# Patient Record
Sex: FEMALE | Race: OTHER | Employment: UNEMPLOYED | URBAN - METROPOLITAN AREA
[De-identification: names, ages, dates, MRNs, and addresses within clinical notes are randomized per-mention and may not be internally consistent; named-entity substitution may affect disease eponyms.]

---

## 2024-05-02 ENCOUNTER — HOSPITAL ENCOUNTER (EMERGENCY)
Facility: HOSPITAL | Age: 34
Discharge: HOME | End: 2024-05-02
Attending: EMERGENCY MEDICINE

## 2024-05-02 VITALS
RESPIRATION RATE: 20 BRPM | TEMPERATURE: 98.2 F | OXYGEN SATURATION: 100 % | DIASTOLIC BLOOD PRESSURE: 55 MMHG | HEART RATE: 79 BPM | SYSTOLIC BLOOD PRESSURE: 114 MMHG

## 2024-05-02 DIAGNOSIS — M54.50 ACUTE LOW BACK PAIN: Primary | ICD-10-CM

## 2024-05-02 PROCEDURE — 99284 EMERGENCY DEPT VISIT MOD MDM: CPT

## 2024-05-02 PROCEDURE — 99282 EMERGENCY DEPT VISIT SF MDM: CPT

## 2024-05-02 RX ORDER — ACETAMINOPHEN 325 MG/1
975 TABLET ORAL ONCE
Status: COMPLETED | OUTPATIENT
Start: 2024-05-02 | End: 2024-05-02

## 2024-05-02 RX ORDER — ACETAMINOPHEN 500 MG
1000 TABLET ORAL EVERY 8 HOURS
Qty: 40 TABLET | Refills: 0 | Status: SHIPPED | OUTPATIENT
Start: 2024-05-02 | End: 2024-05-02

## 2024-05-02 RX ORDER — ACETAMINOPHEN 500 MG
1000 TABLET ORAL EVERY 8 HOURS
Qty: 40 TABLET | Refills: 0 | Status: SHIPPED | OUTPATIENT
Start: 2024-05-02

## 2024-05-02 RX ORDER — IBUPROFEN 600 MG/1
600 TABLET ORAL EVERY 6 HOURS PRN
Qty: 30 TABLET | Refills: 0 | Status: SHIPPED | OUTPATIENT
Start: 2024-05-02

## 2024-05-02 RX ORDER — IBUPROFEN 600 MG/1
600 TABLET ORAL ONCE
Status: COMPLETED | OUTPATIENT
Start: 2024-05-02 | End: 2024-05-02

## 2024-05-02 RX ORDER — IBUPROFEN 600 MG/1
600 TABLET ORAL EVERY 6 HOURS PRN
Qty: 30 TABLET | Refills: 0 | Status: SHIPPED | OUTPATIENT
Start: 2024-05-02 | End: 2024-05-02

## 2024-05-02 RX ORDER — METHOCARBAMOL 500 MG/1
500 TABLET, FILM COATED ORAL ONCE
Status: COMPLETED | OUTPATIENT
Start: 2024-05-02 | End: 2024-05-02

## 2024-05-02 RX ORDER — METHOCARBAMOL 500 MG/1
500 TABLET, FILM COATED ORAL 2 TIMES DAILY
Qty: 20 TABLET | Refills: 0 | Status: SHIPPED | OUTPATIENT
Start: 2024-05-02

## 2024-05-02 RX ORDER — OXYCODONE HYDROCHLORIDE 5 MG/1
5 TABLET ORAL ONCE
Status: COMPLETED | OUTPATIENT
Start: 2024-05-02 | End: 2024-05-02

## 2024-05-02 RX ORDER — METHOCARBAMOL 500 MG/1
500 TABLET, FILM COATED ORAL 2 TIMES DAILY
Qty: 20 TABLET | Refills: 0 | Status: SHIPPED | OUTPATIENT
Start: 2024-05-02 | End: 2024-05-02

## 2024-05-02 RX ORDER — DIAZEPAM 2 MG/1
2 TABLET ORAL ONCE
Status: COMPLETED | OUTPATIENT
Start: 2024-05-02 | End: 2024-05-02

## 2024-05-02 RX ADMIN — DIAZEPAM 2 MG: 2 TABLET ORAL at 01:39

## 2024-05-02 RX ADMIN — OXYCODONE 5 MG: 5 TABLET ORAL at 01:40

## 2024-05-02 RX ADMIN — IBUPROFEN 600 MG: 600 TABLET, FILM COATED ORAL at 01:39

## 2024-05-02 RX ADMIN — METHOCARBAMOL 500 MG: 500 TABLET ORAL at 01:39

## 2024-05-02 RX ADMIN — ACETAMINOPHEN 975 MG: 325 TABLET, FILM COATED ORAL at 01:39

## 2024-05-02 NOTE — ED PROVIDER NOTES
History  Chief Complaint   Patient presents with    Back Pain     Pt with back pain radiating down leg.  Pt has not taken any meds.      33-year-old female with no pertinent medical history presents to ED for evaluation of left low back pain.  Patient is [place]-speaking and a  was used during examination.  Patient states that since Sunday she has had excruciating left low back pain which radiates into her left leg.  Patient states that she was at work lifting up heavy supplies and felt a sudden onset stabbing pain in her left low back.  Since then it has increased and spread into her legs.  Denies acute loss of bowel or bladder continence.  Denies saddle anesthesia.  Denies history of low back pain.  Denies any recent falls.  Denies fever, chills, dysuria, hematuria, increased urinary frequency, vaginal discharge, shortness of breath, chest pain.  Patient has not taken any medication for her low back pain.         None       History reviewed. No pertinent past medical history.    History reviewed. No pertinent surgical history.    History reviewed. No pertinent family history.  I have reviewed and agree with the history as documented.    E-Cigarette/Vaping     E-Cigarette/Vaping Substances          Review of Systems   Musculoskeletal:  Positive for back pain.   All other systems reviewed and are negative.      Physical Exam  Physical Exam  Vitals and nursing note reviewed.   Constitutional:       General: She is not in acute distress.     Appearance: She is well-developed. She is not toxic-appearing or diaphoretic.      Comments: Appears uncomfortable in bed secondary to low back pain.   HENT:      Head: Normocephalic and atraumatic.   Eyes:      Conjunctiva/sclera: Conjunctivae normal.      Pupils: Pupils are equal, round, and reactive to light.   Cardiovascular:      Rate and Rhythm: Normal rate and regular rhythm.      Pulses: Normal pulses.      Heart sounds: Normal heart sounds. No murmur heard.      No friction rub. No gallop.   Pulmonary:      Effort: Pulmonary effort is normal. No respiratory distress.      Breath sounds: Normal breath sounds. No stridor. No wheezing, rhonchi or rales.   Abdominal:      Palpations: Abdomen is soft.      Tenderness: There is no abdominal tenderness.   Musculoskeletal:         General: No swelling.      Cervical back: Normal and neck supple.      Thoracic back: Normal.      Lumbar back: Tenderness present. No edema, deformity, signs of trauma, lacerations or bony tenderness.        Back:       Comments: Exquisitely tender to palpation of the left lumbar paraspinal musculature.  No midline tenderness.  No overlying skin changes of the low back.   Skin:     General: Skin is warm and dry.      Capillary Refill: Capillary refill takes less than 2 seconds.      Coloration: Skin is not jaundiced or pale.      Findings: No rash.   Neurological:      General: No focal deficit present.      Mental Status: She is alert and oriented to person, place, and time. Mental status is at baseline.   Psychiatric:         Mood and Affect: Mood normal.         Vital Signs  ED Triage Vitals [05/02/24 0059]   Temperature Pulse Respirations Blood Pressure SpO2   98.2 °F (36.8 °C) 79 20 114/55 100 %      Temp Source Heart Rate Source Patient Position - Orthostatic VS BP Location FiO2 (%)   Oral Monitor Sitting Right arm --      Pain Score       10 - Worst Possible Pain           Vitals:    05/02/24 0059   BP: 114/55   Pulse: 79   Patient Position - Orthostatic VS: Sitting         Visual Acuity      ED Medications  Medications   ibuprofen (MOTRIN) tablet 600 mg (600 mg Oral Given 5/2/24 0139)   acetaminophen (TYLENOL) tablet 975 mg (975 mg Oral Given 5/2/24 0139)   methocarbamol (ROBAXIN) tablet 500 mg (500 mg Oral Given 5/2/24 0139)   oxyCODONE (ROXICODONE) IR tablet 5 mg (5 mg Oral Given 5/2/24 0140)   diazepam (VALIUM) tablet 2 mg (2 mg Oral Given 5/2/24 0139)       Diagnostic Studies  Results  Reviewed       None                   No orders to display              Procedures  Procedures         ED Course                                             Medical Decision Making  33-year-old female presents to ED for evaluation of left low back pain as above.  On physical examination patient appears uncomfortable.  Vital signs stable.  No fever.  Nontachycardic.  Alert responding to questions appropriately.  Nontoxic-appearing.  Exquisitely tender to palpation of the left lumbar paraspinal musculature.  No murmur.  Normal breath sounds.  No loss of motor function of the lower extremities.  No saddle anesthesia.  No acute loss of bowel or bladder continence.  No urinary symptoms to suggest urinary tract etiology of pain.  Plan to treat patient's low back pain symptomatically with dose of Robaxin, Tylenol, ibuprofen, diazepam, oxycodone.  Will reevaluate after medication.    Reevaluated patient.  Patient feels better at this time.  Plan to discharge patient with prescription for Tylenol, ibuprofen, Robaxin.  Referral to [facility] comprehensive spine program provided.  Advised patient to modify activity in the coming days to reduce strain on low back.  Patient agreeable to plan.  Patient discharged.    Prior to discharge, discharge instructions were discussed with patient at bedside. Patient was provided both verbal and written instructions. Patient is understanding of the discharge instructions and is agreeable to plan of care. Return precautions were discussed with patient bedside, patient verbalized understanding of signs and symptoms that would necessitate return to the ED. All questions were answered. Patient was comfortable with the plan of care and discharged to home.     Risk  OTC drugs.  Prescription drug management.             Disposition  Final diagnoses:   Acute low back pain     Time reflects when diagnosis was documented in both MDM as applicable and the Disposition within this note       Time User  Action Codes Description Comment    5/2/2024  2:24 AM Edward Temple Add [M54.50] Acute low back pain           ED Disposition       ED Disposition   Discharge    Condition   Stable    Date/Time   Thu May 2, 2024  2:24 AM    Comment   Agustina Guevara discharge to home/self care.                   Follow-up Information       Follow up With Specialties Details Why Contact Info Additional Information    [facility]   [phone]            Discharge Medication List as of 5/2/2024  2:27 AM        START taking these medications    Details   acetaminophen (TYLENOL) 500 mg tablet Take 2 tablets (1,000 mg total) by mouth every 8 (eight) hours, Starting Thu 5/2/2024, Print      ibuprofen (MOTRIN) 600 mg tablet Take 1 tablet (600 mg total) by mouth every 6 (six) hours as needed for mild pain or moderate pain, Starting Thu 5/2/2024, Print      methocarbamol (ROBAXIN) 500 mg tablet Take 1 tablet (500 mg total) by mouth 2 (two) times a day, Starting u 5/2/2024, Print             No discharge procedures on file.    PDMP Review       None            ED Provider  Electronically Signed by             Edward Temple PA-C  05/03/24 1514

## 2024-09-21 ENCOUNTER — HOSPITAL ENCOUNTER (EMERGENCY)
Facility: HOSPITAL | Age: 34
Discharge: HOME | End: 2024-09-21

## 2024-09-21 VITALS
RESPIRATION RATE: 18 BRPM | OXYGEN SATURATION: 100 % | TEMPERATURE: 98 F | HEART RATE: 64 BPM | DIASTOLIC BLOOD PRESSURE: 74 MMHG | SYSTOLIC BLOOD PRESSURE: 117 MMHG | WEIGHT: 145.5 LBS

## 2024-09-21 DIAGNOSIS — M54.9 MUSCULOSKELETAL BACK PAIN: Primary | ICD-10-CM

## 2024-09-21 LAB
BILIRUB UR QL STRIP: NEGATIVE
CLARITY UR: CLEAR
COLOR UR: NORMAL
EXT PREGNANCY TEST URINE: NEGATIVE
EXT. CONTROL: NORMAL
GLUCOSE UR STRIP-MCNC: NEGATIVE MG/DL
HGB UR QL STRIP.AUTO: NEGATIVE
KETONES UR STRIP-MCNC: NEGATIVE MG/DL
LEUKOCYTE ESTERASE UR QL STRIP: NEGATIVE
NITRITE UR QL STRIP: NEGATIVE
PH UR STRIP.AUTO: 6.5 [PH]
PROT UR STRIP-MCNC: NEGATIVE MG/DL
SP GR UR STRIP.AUTO: 1.02 (ref 1–1.03)
UROBILINOGEN UR STRIP-ACNC: <2 MG/DL

## 2024-09-21 PROCEDURE — 81003 URINALYSIS AUTO W/O SCOPE: CPT

## 2024-09-21 PROCEDURE — 96372 THER/PROPH/DIAG INJ SC/IM: CPT

## 2024-09-21 PROCEDURE — 99284 EMERGENCY DEPT VISIT MOD MDM: CPT

## 2024-09-21 PROCEDURE — 81025 URINE PREGNANCY TEST: CPT

## 2024-09-21 PROCEDURE — 99283 EMERGENCY DEPT VISIT LOW MDM: CPT

## 2024-09-21 RX ORDER — LIDOCAINE 50 MG/G
1 PATCH TOPICAL DAILY
Qty: 7 PATCH | Refills: 0 | Status: SHIPPED | OUTPATIENT
Start: 2024-09-21

## 2024-09-21 RX ORDER — NAPROXEN 500 MG/1
500 TABLET ORAL 2 TIMES DAILY WITH MEALS
Qty: 14 TABLET | Refills: 0 | Status: SHIPPED | OUTPATIENT
Start: 2024-09-21 | End: 2024-09-28

## 2024-09-21 RX ORDER — DIAZEPAM 5 MG
5 TABLET ORAL ONCE
Status: COMPLETED | OUTPATIENT
Start: 2024-09-21 | End: 2024-09-21

## 2024-09-21 RX ORDER — LIDOCAINE 50 MG/G
1 PATCH TOPICAL ONCE
Status: DISCONTINUED | OUTPATIENT
Start: 2024-09-21 | End: 2024-09-21 | Stop reason: HOSPADM

## 2024-09-21 RX ORDER — KETOROLAC TROMETHAMINE 30 MG/ML
15 INJECTION, SOLUTION INTRAMUSCULAR; INTRAVENOUS ONCE
Status: COMPLETED | OUTPATIENT
Start: 2024-09-21 | End: 2024-09-21

## 2024-09-21 RX ADMIN — KETOROLAC TROMETHAMINE 15 MG: 30 INJECTION, SOLUTION INTRAMUSCULAR; INTRAVENOUS at 01:35

## 2024-09-21 RX ADMIN — LIDOCAINE 1 PATCH: 700 PATCH TOPICAL at 01:34

## 2024-09-21 RX ADMIN — DIAZEPAM 5 MG: 5 TABLET ORAL at 01:35

## 2024-09-21 NOTE — Clinical Note
Agustina Guevara was seen and treated in our emergency department on 9/21/2024.                Diagnosis:     Agustina  may return to work on return date.    She may return on this date: 09/22/2024         If you have any questions or concerns, please don't hesitate to call.      Larry Miller, DO    ______________________________           _______________          _______________  Hospital Representative                              Date                                Time

## 2024-09-21 NOTE — DISCHARGE INSTRUCTIONS
You were evaluated in the Emergency Department today for your back pain. Your evaluation did not show signs of medical conditions requiring emergent intervention at this time, and we feel safe discharging you home.    I gave you a prescription for naproxen and lidoderm patches. These are at your pharmacy. Please take as prescribed. You can also take tylenol for pain. This medication is over the counter.    Please schedule an appointment for follow-up with your primary care physician this week for further evaluation of your symptoms.    Return to the Emergency Department if you experience worsening back pain, difficulty walking, fevers, numbness, tingling, difficulty urinating, incontinence, or any other concerning symptoms.    Thank you for choosing us for your care.    Usted fue evaluado hoy en el Departamento de Emergencias por dick dolor de espalda. Dick evaluación no mostró signos de condiciones médicas que requieran filiberto intervención urgente en tuan momento, y nos sentimos seguros de darle el santino a dick casa.    Te receté parches de naproxeno y lidoderm. Estos están en tu farmacia. Tómelo según lo prescrito. También puedes melanie tylenol para el dolor. Tuan medicamento es de venta gama.    Programe filiberto scott de seguimiento con dick médico de atención primaria esta semana para filiberto evaluación adicional de jacques síntomas.     Regrese al Departamento de Emergencias si experimenta un empeoramiento del dolor de espalda, dificultad para caminar, fiebre, entumecimiento, hormigueo, dificultad para orinar, incontinencia o cualquier otro síntoma preocupante.    Nia por elegirnos para dick atención.

## 2024-09-21 NOTE — ED PROVIDER NOTES
1. Musculoskeletal back pain      ED Disposition       ED Disposition   Discharge    Condition   Stable    Date/Time   Sat Sep 21, 2024  1:47 AM    Comment   Agustina Guevara discharge to home/self care.                   Assessment & Plan       Medical Decision Making  Patient with history as above presented with multiple complaints. History obtained from patient using a .    Differential diagnosis includes: Muscle strain, muscle spasm, urinary infection    Plan: Urine, urine pregnancy, Toradol, Valium, Lidoderm    Urine unremarkable and without signs of infection.  Patient was treated with above with improvement in symptoms. Reassessed the patient and they continue to be well appearing. Presentation most consistent with nonemergent cause of back pain, likely strain versus spasm.  Ocular symptoms seem to be chronic and associated with stress as per patient, appears nonemergent nature, recommended she follow-up with an optometrist. Stable for outpatient management.    Disposition: Discharged with instructions to obtain outpatient follow up of patient's symptoms and findings, with strict return precautions if patient develops new or worsening symptoms. Patient understands this plan and is agreeable. All questions answered. Patient discharged home with return precautions.    Amount and/or Complexity of Data Reviewed  Labs: ordered. Decision-making details documented in ED Course.    Risk  Prescription drug management.                ED Course as of 09/21/24 0353   Sat Sep 21, 2024   0134 PREGNANCY TEST URINE: Negative       Medications   lidocaine (LIDODERM) 5 % patch 1 patch (1 patch Topical Medication Applied 9/21/24 0134)   ketorolac (TORADOL) injection 15 mg (15 mg Intramuscular Given 9/21/24 0135)   diazepam (VALIUM) tablet 5 mg (5 mg Oral Given 9/21/24 0135)       History of Present Illness       Patient is a 34-year-old female with no significant past medical history, presenting for evaluation  "of multiple complaints.  Her primary complaint is some pain in her left-sided lower back.  She describes this as a spasming sensation.  It is worsened with motion and relieved by rest.  Started approximately 5 days to a week ago without any provoking factors.  She has been taking ibuprofen with some relief.  She denies any urinary retention or incontinence.  She denies any change in sensation/saddle anesthesia.  She has remained ambulatory.  She denies any fevers.  She denies any urinary symptoms.  Additionally, she reports some \"palpitations\" in her left thigh as well as some blurriness of her vision.  She reports that she has been experiencing this for several years now.  She has been evaluated by outside physicians and told that this was not emergent.  She has not seen an eye doctor since coming to [place] over the last year and has not been wearing her prescribed glasses for this timeframe.  She denies any pain in the eye or pain with extraocular motions.  She is otherwise without complaint.        Review of Systems   Eyes:  Positive for visual disturbance. Negative for photophobia, pain, discharge and itching.   Musculoskeletal:  Positive for back pain.   Neurological:  Negative for weakness and numbness.           Objective     ED Triage Vitals   Temperature Pulse Blood Pressure Respirations SpO2 Patient Position - Orthostatic VS   09/21/24 0052 09/21/24 0052 09/21/24 0052 09/21/24 0052 09/21/24 0052 09/21/24 0052   98 °F (36.7 °C) 64 117/74 18 100 % Sitting      Temp src Heart Rate Source BP Location FiO2 (%) Pain Score    -- 09/21/24 0052 09/21/24 0052 -- 09/21/24 0135     Monitor Right arm  7        Physical Exam  Vitals and nursing note reviewed.   Constitutional:       General: She is not in acute distress.     Appearance: Normal appearance. She is not ill-appearing or toxic-appearing.   HENT:      Head: Normocephalic and atraumatic.      Right Ear: External ear normal.      Left Ear: External ear " normal.      Nose: Nose normal.   Eyes:      General: No scleral icterus.        Right eye: No discharge.         Left eye: No discharge.      Extraocular Movements: Extraocular movements intact.      Conjunctiva/sclera: Conjunctivae normal.      Pupils: Pupils are equal, round, and reactive to light.      Comments: EOMI. PERRLA. Nothing noted around eye/orbit, lids and lashes normal, no conjunctivitis or subconjunctival hemorrhage.   Cardiovascular:      Rate and Rhythm: Normal rate.      Heart sounds: Normal heart sounds. No murmur heard.     No friction rub. No gallop.   Pulmonary:      Effort: Pulmonary effort is normal. No respiratory distress.      Breath sounds: Normal breath sounds.   Abdominal:      General: Abdomen is flat. There is no distension.      Palpations: Abdomen is soft. There is no mass.      Tenderness: There is no abdominal tenderness.   Genitourinary:     Comments: Deferred  Musculoskeletal:      Comments: Minimal tenderness of the left sided paralumbar muscles, no bony vertebral tenderness. No stepoffs, deformities or skin changes. Full ROM. Strength of bilateral lower extremities 5/5 in ankle flexion and extension. EHL intact. Sensation intact including S1 distribution. DP/PT pulses 2+/4.    Skin:     General: Skin is warm and dry.   Neurological:      General: No focal deficit present.      Mental Status: She is alert.         Labs Reviewed   POCT PREGNANCY, URINE - Normal       Result Value    EXT Preg Test, Ur Negative      Control Valid     UA W REFLEX TO MICROSCOPIC WITH REFLEX TO CULTURE    Color, UA Light Yellow      Clarity, UA Clear      Specific Gravity, UA 1.023      pH, UA 6.5      Leukocytes, UA Negative      Nitrite, UA Negative      Protein, UA Negative      Glucose, UA Negative      Ketones, UA Negative      Urobilinogen, UA <2.0      Bilirubin, UA Negative      Occult Blood, UA Negative       No orders to display       Procedures    ED Medication and Procedure Management    Prior to Admission Medications   Prescriptions Last Dose Informant Patient Reported? Taking?   acetaminophen (TYLENOL) 500 mg tablet   No No   Sig: Take 2 tablets (1,000 mg total) by mouth every 8 (eight) hours   ibuprofen (MOTRIN) 600 mg tablet   No No   Sig: Take 1 tablet (600 mg total) by mouth every 6 (six) hours as needed for mild pain or moderate pain   methocarbamol (ROBAXIN) 500 mg tablet   No No   Sig: Take 1 tablet (500 mg total) by mouth 2 (two) times a day      Facility-Administered Medications: None     Discharge Medication List as of 9/21/2024  1:48 AM        START taking these medications    Details   lidocaine (Lidoderm) 5 % Apply 1 patch topically over 12 hours daily Remove & Discard patch within 12 hours or as directed by MD, Starting Sat 9/21/2024, Normal      naproxen (Naprosyn) 500 mg tablet Take 1 tablet (500 mg total) by mouth 2 (two) times a day with meals for 7 days, Starting Sat 9/21/2024, Until Sat 9/28/2024, Normal           CONTINUE these medications which have NOT CHANGED    Details   acetaminophen (TYLENOL) 500 mg tablet Take 2 tablets (1,000 mg total) by mouth every 8 (eight) hours, Starting u 5/2/2024, Print      ibuprofen (MOTRIN) 600 mg tablet Take 1 tablet (600 mg total) by mouth every 6 (six) hours as needed for mild pain or moderate pain, Starting Thu 5/2/2024, Print      methocarbamol (ROBAXIN) 500 mg tablet Take 1 tablet (500 mg total) by mouth 2 (two) times a day, Starting u 5/2/2024, Print           No discharge procedures on file.     Larry Miller, DO  09/21/24 0353

## 2024-10-02 ENCOUNTER — HOSPITAL ENCOUNTER (EMERGENCY)
Facility: HOSPITAL | Age: 34
Discharge: HOME | End: 2024-10-02
Attending: EMERGENCY MEDICINE

## 2024-10-02 ENCOUNTER — APPOINTMENT (EMERGENCY)
Dept: RADIOLOGY | Facility: HOSPITAL | Age: 34
End: 2024-10-02

## 2024-10-02 VITALS
DIASTOLIC BLOOD PRESSURE: 62 MMHG | HEART RATE: 88 BPM | SYSTOLIC BLOOD PRESSURE: 108 MMHG | WEIGHT: 145.5 LBS | RESPIRATION RATE: 20 BRPM | OXYGEN SATURATION: 98 % | TEMPERATURE: 99.2 F

## 2024-10-02 DIAGNOSIS — M62.838 TRAPEZIUS MUSCLE SPASM: Primary | ICD-10-CM

## 2024-10-02 PROCEDURE — 72040 X-RAY EXAM NECK SPINE 2-3 VW: CPT

## 2024-10-02 PROCEDURE — 96372 THER/PROPH/DIAG INJ SC/IM: CPT

## 2024-10-02 PROCEDURE — 99284 EMERGENCY DEPT VISIT MOD MDM: CPT | Performed by: EMERGENCY MEDICINE

## 2024-10-02 PROCEDURE — 99283 EMERGENCY DEPT VISIT LOW MDM: CPT

## 2024-10-02 RX ORDER — KETOROLAC TROMETHAMINE 30 MG/ML
30 INJECTION, SOLUTION INTRAMUSCULAR; INTRAVENOUS ONCE
Status: COMPLETED | OUTPATIENT
Start: 2024-10-02 | End: 2024-10-02

## 2024-10-02 RX ORDER — MORPHINE SULFATE 4 MG/ML
4 INJECTION, SOLUTION INTRAMUSCULAR; INTRAVENOUS ONCE
Status: COMPLETED | OUTPATIENT
Start: 2024-10-02 | End: 2024-10-02

## 2024-10-02 RX ORDER — IBUPROFEN 400 MG/1
400 TABLET, FILM COATED ORAL EVERY 6 HOURS PRN
Qty: 15 TABLET | Refills: 0 | Status: SHIPPED | OUTPATIENT
Start: 2024-10-02

## 2024-10-02 RX ORDER — CYCLOBENZAPRINE HCL 10 MG
10 TABLET ORAL 2 TIMES DAILY PRN
Qty: 15 TABLET | Refills: 0 | Status: SHIPPED | OUTPATIENT
Start: 2024-10-02

## 2024-10-02 RX ADMIN — MORPHINE SULFATE 4 MG: 4 INJECTION INTRAVENOUS at 16:15

## 2024-10-02 RX ADMIN — KETOROLAC TROMETHAMINE 30 MG: 30 INJECTION, SOLUTION INTRAMUSCULAR; INTRAVENOUS at 16:15

## 2024-10-02 NOTE — Clinical Note
Agustina Guevara was seen and treated in our emergency department on 10/2/2024.                Diagnosis:     Agustina  .    She may return on this date: 10/05/2024         If you have any questions or concerns, please don't hesitate to call.      Aron Garces MD    ______________________________           _______________          _______________  Hospital Representative                              Date                                Time

## 2024-10-02 NOTE — ED PROVIDER NOTES
Final diagnoses:   Trapezius muscle spasm     ED Disposition       ED Disposition   Discharge    Condition   Stable    Date/Time   Wed Oct 2, 2024  5:47 PM    Comment   Agustina Guevara discharge to home/self care.                   Assessment & Plan       Medical Decision Making  Amount and/or Complexity of Data Reviewed  Radiology: ordered.    Risk  Prescription drug management.    X-ray was performed did not show any acute abnormality/fracture.  I suspect that this is mostly a muscle strain although unclear etiology as there was no acute traumatic event although could have slept on this area in the wrong position and that caused it to get worse.  Since it radiates down her arm we will send her to the spinal [place] although this does not exactly seem like a radicular problem.  She has no fever to suggest an infection.  She otherwise looks clinically well although in pain she was given pain medication here and we will send her out with anti-inflammatories and a muscle relaxer.         Medications   ketorolac (TORADOL) injection 30 mg (30 mg Intramuscular Given 10/2/24 1615)   morphine injection 4 mg (4 mg Intramuscular Given 10/2/24 1615)       ED Risk Strat Scores                           SBIRT 20yo+      Flowsheet Row Most Recent Value   Initial Alcohol Screen: US AUDIT-C     1. How often do you have a drink containing alcohol? 0 Filed at: 10/02/2024 1526   2. How many drinks containing alcohol do you have on a typical day you are drinking?  0 Filed at: 10/02/2024 1526   3b. FEMALE Any Age, or MALE 65+: How often do you have 4 or more drinks on one occassion? 0 Filed at: 10/02/2024 1526   Audit-C Score 0 Filed at: 10/02/2024 1526   HOLA: How many times in the past year have you...    Used an illegal drug or used a prescription medication for non-medical reasons? Never Filed at: 10/02/2024 1526                            History of Present Illness       Chief Complaint   Patient presents with    Neck Pain     Shoulder Pain     Reports after work had some neck pain, reports today after waking up the pain was unbearable and she is unable to move her neck, reports pain goes down her left arm.        History reviewed. No pertinent past medical history.   History reviewed. No pertinent surgical history.   History reviewed. No pertinent family history.   Social History     Tobacco Use    Smoking status: Never    Smokeless tobacco: Never   Substance Use Topics    Alcohol use: Never    Drug use: Never      E-Cigarette/Vaping      E-Cigarette/Vaping Substances    Nicotine No     THC No     CBD No     Flavoring No     Other No     Unknown No       I have reviewed and agree with the history as documented.     HPI  History through .  Pain in the right trapezius area when waking up although had a little bit of pain yesterday before going to bed.  She does not recall any injury.  She states it goes down her right arm.  She has pain in the right trapezius and into the right side of the neck.  No fevers or chills.  She notes no injury.  There is no weakness.  There is no numbness.  No significant medical problems.  No fever.  Review of Systems        Objective       ED Triage Vitals [10/02/24 1523]   Temperature Pulse Blood Pressure Respirations SpO2 Patient Position - Orthostatic VS   99.2 °F (37.3 °C) 88 108/62 20 98 % Sitting      Temp Source Heart Rate Source BP Location FiO2 (%) Pain Score    Oral Monitor Right arm -- 10 - Worst Possible Pain      Vitals      Date and Time Temp Pulse SpO2 Resp BP Pain Score FACES Pain Rating User   10/02/24 1615 -- -- -- -- -- 9 -- EC   10/02/24 1523 99.2 °F (37.3 °C) 88 98 % 20 108/62 10 - Worst Possible Pain -- SG            Physical Exam  Vitals and nursing note reviewed.   Constitutional:       General: She is not in acute distress.     Appearance: Normal appearance. She is not ill-appearing, toxic-appearing or diaphoretic.   HENT:      Head: Normocephalic and atraumatic.   Neck:       Comments: Patient has decreased range of motion wants to keep her neck still.  Tenderness to palpation of the right trapezius and along the right side of the neck.  There was no obvious midline tenderness.  No mass.  Musculoskeletal:      Comments: I do not appreciate any tenderness to the shoulder area.  Pain is mostly in the trapezius to palpation.  She has normal distal strength and normal distal sensation.  Normal pulses.  Normal reflexes.   Skin:     General: Skin is warm.      Findings: No erythema.   Neurological:      General: No focal deficit present.      Mental Status: She is alert.      Sensory: No sensory deficit.      Motor: No weakness.      Deep Tendon Reflexes: Reflexes normal.   Psychiatric:         Mood and Affect: Mood normal.         Results Reviewed       None            XR cervical spine 2 or 3 views   ED Interpretation by Aron Garces MD (10/02 1731)   I have reviewed the film, per my independent interpretation : no fracture. Straightening of lordosis.        Final Interpretation by Mo Boogie MD (10/02 1651)      No acute osseous abnormality.         Workstation performed: [id number]             Procedures    ED Medication and Procedure Management   Prior to Admission Medications   Prescriptions Last Dose Informant Patient Reported? Taking?   acetaminophen (TYLENOL) 500 mg tablet   No No   Sig: Take 2 tablets (1,000 mg total) by mouth every 8 (eight) hours   ibuprofen (MOTRIN) 600 mg tablet   No No   Sig: Take 1 tablet (600 mg total) by mouth every 6 (six) hours as needed for mild pain or moderate pain   lidocaine (Lidoderm) 5 %   No No   Sig: Apply 1 patch topically over 12 hours daily Remove & Discard patch within 12 hours or as directed by MD   methocarbamol (ROBAXIN) 500 mg tablet   No No   Sig: Take 1 tablet (500 mg total) by mouth 2 (two) times a day   naproxen (Naprosyn) 500 mg tablet   No No   Sig: Take 1 tablet (500 mg total) by mouth 2 (two) times a day with  meals for 7 days      Facility-Administered Medications: None     Discharge Medication List as of 10/2/2024  5:53 PM        START taking these medications    Details   cyclobenzaprine (FLEXERIL) 10 mg tablet Take 1 tablet (10 mg total) by mouth 2 (two) times a day as needed for muscle spasms for up to 15 doses, Starting Wed 10/2/2024, Normal      !! ibuprofen (MOTRIN) 400 mg tablet Take 1 tablet (400 mg total) by mouth every 6 (six) hours as needed for mild pain for up to 15 doses, Starting Wed 10/2/2024, Normal       !! - Potential duplicate medications found. Please discuss with provider.        CONTINUE these medications which have NOT CHANGED    Details   acetaminophen (TYLENOL) 500 mg tablet Take 2 tablets (1,000 mg total) by mouth every 8 (eight) hours, Starting Thu 5/2/2024, Print      !! ibuprofen (MOTRIN) 600 mg tablet Take 1 tablet (600 mg total) by mouth every 6 (six) hours as needed for mild pain or moderate pain, Starting Thu 5/2/2024, Print      lidocaine (Lidoderm) 5 % Apply 1 patch topically over 12 hours daily Remove & Discard patch within 12 hours or as directed by MD, Starting Sat 9/21/2024, Normal      methocarbamol (ROBAXIN) 500 mg tablet Take 1 tablet (500 mg total) by mouth 2 (two) times a day, Starting u 5/2/2024, Print      naproxen (Naprosyn) 500 mg tablet Take 1 tablet (500 mg total) by mouth 2 (two) times a day with meals for 7 days, Starting Sat 9/21/2024, Until Sat 9/28/2024, Normal       !! - Potential duplicate medications found. Please discuss with provider.          ED SEPSIS DOCUMENTATION   Time reflects when diagnosis was documented in both MDM as applicable and the Disposition within this note       Time User Action Codes Description Comment    10/2/2024  5:47 PM Aron Garces Add [M62.838] Trapezius muscle spasm                  Aron Garces MD  10/02/24 1844

## 2025-03-24 ENCOUNTER — HOSPITAL ENCOUNTER (EMERGENCY)
Facility: HOSPITAL | Age: 35
Discharge: HOME | End: 2025-03-24
Attending: EMERGENCY MEDICINE | Admitting: EMERGENCY MEDICINE

## 2025-03-24 VITALS
RESPIRATION RATE: 18 BRPM | HEART RATE: 76 BPM | TEMPERATURE: 97.9 F | SYSTOLIC BLOOD PRESSURE: 106 MMHG | DIASTOLIC BLOOD PRESSURE: 63 MMHG | OXYGEN SATURATION: 100 %

## 2025-03-24 DIAGNOSIS — N64.4 BREAST PAIN, RIGHT: Primary | ICD-10-CM

## 2025-03-24 PROCEDURE — 99283 EMERGENCY DEPT VISIT LOW MDM: CPT | Performed by: EMERGENCY MEDICINE

## 2025-03-24 PROCEDURE — 99282 EMERGENCY DEPT VISIT SF MDM: CPT

## 2025-03-24 NOTE — ED ATTENDING ATTESTATION
3/24/2025  I, Mackenzie Dorantes, , saw and evaluated the patient. I have discussed the patient with the resident/non-physician practitioner and agree with the resident's/non-physician practitioner's findings, Plan of Care, and MDM as documented in the resident's/non-physician practitioner's note, except where noted. All available labs and Radiology studies were reviewed.  I was present for key portions of any procedure(s) performed by the resident/non-physician practitioner and I was immediately available to provide assistance.       At this point I agree with the current assessment done in the Emergency Department.  I have conducted an independent evaluation of this patient a history and physical is as follows:34y F here w/ breast pain.  Notes pain and small lump for about a week - denies hx of cysts or lumps in the past.  +FH of fibrocystic breasts, no FH of breast CA. No skin changes, no nipple inversion or drainage, no sig swelling. No f/c/s, no trauma or injury. No other complaints.  No previous breast imaging.  Exam WNWD nad, mmm, neck supple, resp non-labored, cv regular rate, right breast, firm, mobile nodule upper / outer quadrant, no skin/nipple changes, no LAD, abd nd, ext no cce, skin dry, neuro non-focal, normal mood.  A/P Right breast lump - d/w pt will need specialized breast imaging not available in the ED.  Doubt abscess. Likely fibrocystic/fribroadenoma. Will refer to the breast center.    ED Course         Critical Care Time  Procedures  1. Breast pain, right          Time reflects when diagnosis was documented in both MDM as applicable and the Disposition within this note       Time User Action Codes Description Comment    3/24/2025 10:40 AM Paulino Parson Add [N64.4] Breast pain, right           ED Disposition       ED Disposition   Discharge    Condition   Stable    Date/Time   Mon Mar 24, 2025 10:40 AM    Comment   Agustina Guevara discharge to home/self care.                   Follow-up  Information       Follow up With Specialties Details Why Contact Info    [facility]    [address] [place] [address] [phone]

## 2025-03-24 NOTE — Clinical Note
Agustina Guevara was seen and treated in our emergency department on 3/24/2025.                Diagnosis:     Agustina  may return to work on return date.    She may return on this date: 03/25/2025         If you have any questions or concerns, please don't hesitate to call.      Paulino Parson MD    ______________________________           _______________          _______________  Hospital Representative                              Date                                Time

## 2025-03-24 NOTE — ED PROVIDER NOTES
Time reflects when diagnosis was documented in both MDM as applicable and the Disposition within this note       Time User Action Codes Description Comment    3/24/2025 10:40 AM Paulino Parson Add [N64.4] Breast pain, right           ED Disposition       ED Disposition   Discharge    Condition   Stable    Date/Time   Mon Mar 24, 2025 10:40 AM    Comment   Agustina Guevara discharge to home/self care.                   Assessment & Plan       Medical Decision Making  34-year-old female presents to the emergency department today for history and examination most concerning for cyst of the breast tissue.  Given examination findings likelihood for abscess is low and this is much more likely presentation consistent with a cyst.  Advised patient she will need to undergo an outpatient evaluation for further imaging.  Contact information given to contact the Center for breast imaging and for further evaluation.  We discussed worrisome symptoms which would require to come back to the hospital to which she verbalized her standing.  Remained hemodynamically stable while under my care and was appropriate for discharge home with outpatient follow-up instructions.             Medications - No data to display    ED Risk Strat Scores                                                History of Present Illness       Chief Complaint   Patient presents with    Breast Pain     Pt reports feeling a mass and pain to right breast x1 week       History reviewed. No pertinent past medical history.   History reviewed. No pertinent surgical history.   History reviewed. No pertinent family history.   Social History     Tobacco Use    Smoking status: Never    Smokeless tobacco: Never   Substance Use Topics    Alcohol use: Never    Drug use: Never      E-Cigarette/Vaping      E-Cigarette/Vaping Substances    Nicotine No     THC No     CBD No     Flavoring No     Other No     Unknown No       I have reviewed and agree with the history as documented.      34-year-old female presents to the emergency department today for evaluation of 1 week history of right breast pain.  She tells me she began to notice some tenderness in the right upper quadrant of her right breast and an associated small palpable mass.  She denies overlying skin changes such as redness or dimpling of the skin.  Denies nipple discharge.  Denies changes with her menstrual cycle.  No swelling of the arm to her knowledge/no pain in the arm.  She has no other symptoms associated with this.  She has had a cyst in her younger years which went away on its own.  She also tells me she has a family history of cyst, namely her sister whose had many.  No family or personal history of breast cancer.        Review of Systems   Constitutional:  Negative for activity change, fatigue and fever.   Respiratory:  Negative for chest tightness and shortness of breath.    Cardiovascular:  Negative for chest pain.   Gastrointestinal:  Negative for abdominal pain.   Neurological:  Negative for dizziness and headaches.           Objective       ED Triage Vitals [03/24/25 0940]   Temperature Pulse Blood Pressure Respirations SpO2 Patient Position - Orthostatic VS   97.9 °F (36.6 °C) 76 106/63 18 100 % Lying      Temp src Heart Rate Source BP Location FiO2 (%) Pain Score    -- Monitor Left arm -- --      Vitals      Date and Time Temp Pulse SpO2 Resp BP Pain Score FACES Pain Rating User   03/24/25 0940 97.9 °F (36.6 °C) 76 100 % 18 106/63 -- -- LP            Physical Exam  Constitutional:       General: She is not in acute distress.     Appearance: Normal appearance.   HENT:      Head: Normocephalic and atraumatic.   Cardiovascular:      Rate and Rhythm: Normal rate.      Pulses: Normal pulses.      Heart sounds: Normal heart sounds.   Pulmonary:      Effort: Pulmonary effort is normal. No respiratory distress.   Skin:     Comments: Tenderness palpation of the right upper quadrant of right breast.  No lymphadenopathy noted  of the right arm/axilla.  No overlying skin changes of the area of tenderness.  There is a small palpable soft mass on the right upper quadrant of the right breast.   Neurological:      Mental Status: She is alert.         Results Reviewed       None            No orders to display       Procedures    ED Medication and Procedure Management   Prior to Admission Medications   Prescriptions Last Dose Informant Patient Reported? Taking?   acetaminophen (TYLENOL) 500 mg tablet   No No   Sig: Take 2 tablets (1,000 mg total) by mouth every 8 (eight) hours   cyclobenzaprine (FLEXERIL) 10 mg tablet   No No   Sig: Take 1 tablet (10 mg total) by mouth 2 (two) times a day as needed for muscle spasms for up to 15 doses   ibuprofen (MOTRIN) 400 mg tablet   No No   Sig: Take 1 tablet (400 mg total) by mouth every 6 (six) hours as needed for mild pain for up to 15 doses   ibuprofen (MOTRIN) 600 mg tablet   No No   Sig: Take 1 tablet (600 mg total) by mouth every 6 (six) hours as needed for mild pain or moderate pain   lidocaine (Lidoderm) 5 %   No No   Sig: Apply 1 patch topically over 12 hours daily Remove & Discard patch within 12 hours or as directed by MD   methocarbamol (ROBAXIN) 500 mg tablet   No No   Sig: Take 1 tablet (500 mg total) by mouth 2 (two) times a day   naproxen (Naprosyn) 500 mg tablet   No No   Sig: Take 1 tablet (500 mg total) by mouth 2 (two) times a day with meals for 7 days      Facility-Administered Medications: None     Discharge Medication List as of 3/24/2025 10:41 AM        CONTINUE these medications which have NOT CHANGED    Details   acetaminophen (TYLENOL) 500 mg tablet Take 2 tablets (1,000 mg total) by mouth every 8 (eight) hours, Starting Thu 5/2/2024, Print      cyclobenzaprine (FLEXERIL) 10 mg tablet Take 1 tablet (10 mg total) by mouth 2 (two) times a day as needed for muscle spasms for up to 15 doses, Starting Wed 10/2/2024, Normal      !! ibuprofen (MOTRIN) 400 mg tablet Take 1 tablet (400  mg total) by mouth every 6 (six) hours as needed for mild pain for up to 15 doses, Starting Wed 10/2/2024, Normal      !! ibuprofen (MOTRIN) 600 mg tablet Take 1 tablet (600 mg total) by mouth every 6 (six) hours as needed for mild pain or moderate pain, Starting Thu 5/2/2024, Print      lidocaine (Lidoderm) 5 % Apply 1 patch topically over 12 hours daily Remove & Discard patch within 12 hours or as directed by MD, Starting Sat 9/21/2024, Normal      methocarbamol (ROBAXIN) 500 mg tablet Take 1 tablet (500 mg total) by mouth 2 (two) times a day, Starting Thu 5/2/2024, Print      naproxen (Naprosyn) 500 mg tablet Take 1 tablet (500 mg total) by mouth 2 (two) times a day with meals for 7 days, Starting Sat 9/21/2024, Until Sat 9/28/2024, Normal       !! - Potential duplicate medications found. Please discuss with provider.        No discharge procedures on file.  ED SEPSIS DOCUMENTATION   Time reflects when diagnosis was documented in both MDM as applicable and the Disposition within this note       Time User Action Codes Description Comment    3/24/2025 10:40 AM Paulino Parson Add [N64.4] Breast pain, right                  Paulino Parson MD  03/24/25 5360